# Patient Record
Sex: MALE | Race: BLACK OR AFRICAN AMERICAN | NOT HISPANIC OR LATINO | Employment: FULL TIME | URBAN - METROPOLITAN AREA
[De-identification: names, ages, dates, MRNs, and addresses within clinical notes are randomized per-mention and may not be internally consistent; named-entity substitution may affect disease eponyms.]

---

## 2022-08-16 ENCOUNTER — HOSPITAL ENCOUNTER (EMERGENCY)
Facility: HOSPITAL | Age: 27
Discharge: HOME | End: 2022-08-16
Attending: EMERGENCY MEDICINE
Payer: MEDICAID

## 2022-08-16 VITALS
HEIGHT: 73 IN | SYSTOLIC BLOOD PRESSURE: 138 MMHG | DIASTOLIC BLOOD PRESSURE: 88 MMHG | RESPIRATION RATE: 16 BRPM | OXYGEN SATURATION: 99 % | BODY MASS INDEX: 33.93 KG/M2 | WEIGHT: 256 LBS | TEMPERATURE: 98 F | HEART RATE: 80 BPM

## 2022-08-16 DIAGNOSIS — L73.2 HIDRADENITIS SUPPURATIVA: ICD-10-CM

## 2022-08-16 DIAGNOSIS — L02.211 ABDOMINAL WALL ABSCESS: Primary | ICD-10-CM

## 2022-08-16 DIAGNOSIS — L02.419 AXILLARY ABSCESS: ICD-10-CM

## 2022-08-16 PROCEDURE — 99284 EMERGENCY DEPT VISIT MOD MDM: CPT | Mod: 25

## 2022-08-16 PROCEDURE — 10060 I&D ABSCESS SIMPLE/SINGLE: CPT | Mod: LT

## 2022-08-16 PROCEDURE — 25000003 PHARM REV CODE 250: Performed by: PHYSICIAN ASSISTANT

## 2022-08-16 RX ORDER — IBUPROFEN 600 MG/1
600 TABLET ORAL EVERY 6 HOURS PRN
Qty: 20 TABLET | Refills: 0 | Status: SHIPPED | OUTPATIENT
Start: 2022-08-16

## 2022-08-16 RX ORDER — HYDROCODONE BITARTRATE AND ACETAMINOPHEN 5; 325 MG/1; MG/1
1 TABLET ORAL EVERY 6 HOURS PRN
Qty: 12 TABLET | Refills: 0 | Status: SHIPPED | OUTPATIENT
Start: 2022-08-16

## 2022-08-16 RX ORDER — LIDOCAINE HYDROCHLORIDE 10 MG/ML
10 INJECTION INFILTRATION; PERINEURAL
Status: COMPLETED | OUTPATIENT
Start: 2022-08-16 | End: 2022-08-16

## 2022-08-16 RX ADMIN — LIDOCAINE HYDROCHLORIDE 10 ML: 10 INJECTION, SOLUTION INFILTRATION; PERINEURAL at 01:08

## 2022-08-16 NOTE — Clinical Note
"Matthias Wadsworthem"  was seen and treated in our emergency department on 8/16/2022.  He may return to work on 08/18/2022.       If you have any questions or concerns, please don't hesitate to call.      SARAH Ma LPN"

## 2022-08-16 NOTE — ED PROVIDER NOTES
Encounter Date: 8/16/2022       History     Chief Complaint   Patient presents with    Abscess     Presents with painful abscess left axilla. Pt states has been increasing past 2 weeks. Pt states some drainage noted last night. Denies any fevers.     HPI   This 26-year-old white male presents emergency room complaining of an abscess on the right upper quadrant of the abdominal wall and in the left axilla.  The patient has had these before.  The patient has had symptoms gradually progressive over the course last 2 weeks.  He has a long history of recurrent abscesses.  There is no fever chills.  He is otherwise well.  Review of patient's allergies indicates:  No Known Allergies  Past Medical History:   Diagnosis Date    Asthma      History reviewed. No pertinent surgical history.  Family History   Problem Relation Age of Onset    Cancer Mother      Social History     Tobacco Use    Smoking status: Never Smoker    Smokeless tobacco: Never Used   Substance Use Topics    Alcohol use: Yes    Drug use: Yes     Types: Marijuana     Review of Systems  The patient was questioned specifically with regard to the following.  General: Fever, chills, sweats. Neuro: Headache. Eyes: eye problems. ENT: Ear pain, sore throat. Cardiovascular: Chest pain. Respiratory: Cough, shortness of breath. Gastrointestinal: Abdominal pain, vomiting, diarrhea. Genitourinary: Painful urination.  Musculoskeletal: Arm and leg problems. Skin: Rash.  The review of systems was negative except for the following:  Right upper quadrant abdominal wall abscess, left axillary abscess.  Physical Exam     Initial Vitals [08/16/22 1323]   BP Pulse Resp Temp SpO2   (!) 141/90 81 16 98.4 °F (36.9 °C) 97 %      MAP       --         Physical Exam  The patient was examined specifically for the following:   General:No significant distress, Good color, Warm and dry. Head and neck:Scalp atraumatic, Neck supple. Neurological:Appropriate conversation, Gross motor  deficits. Eyes:Conjugate gaze, Clear corneas. ENT: No epistaxis. Cardiac: Regular rate and rhythm, Grossly normal heart tones. Pulmonary: Wheezing, Rales. Gastrointestinal: Abdominal tenderness, Abdominal distention. Musculoskeletal: Extremity deformity, Apparent pain with range of motion of the joints. Skin: Rash.   The findings on examination were normal except for the following:  The patient has scarring in the left axilla.  There is a point that is draining a thin yellow pus.  The patient also has a 3 x 1.5 cm abscess in the right upper quadrant of the abdominal wall.  The lungs are clear the heart tones are normal the abdomen is soft.  The patient is not febrile ill or toxic.  ED Course   I & D - Incision and Drainage    Date/Time: 8/16/2022 2:16 PM  Location procedure was performed: [facility] EMERGENCY DEPARTMENT  Performed by: Selvin Jc MD  Authorized by: Selvin Jc MD   Anesthesia: local infiltration    Anesthesia:  Local Anesthetic: lidocaine 1% without epinephrine  Anesthetic total: 5 mL  Risk factor: underlying major vessel and  underlying major nerve  Scalpel size: 11  Incision type: single straight  Complexity: simple  Drainage: pus  Drainage amount: moderate  Wound treatment: incision  Packing material: 1/4 in gauze  Patient tolerance: Patient tolerated the procedure well with no immediate complications  Comments: There was a 2 cm incision on the abscess on the abdominal wall and a 2 cm incision in the left axilla.  The abdominal wall drained a small amount of pus.  The axillary abscess drained a small amount of pus.  I will treat the patient for pain and discharge to outpatient evaluation and treatment.  I doubt cellulitis.        Labs Reviewed - No data to display       Imaging Results    None       Medical decision making:  This patient has an abscess in the abdominal wall an abscess in the left axilla.  There is no evidence of generalized infection.  The patient does not have systemic  symptoms.  I will discharge on pain medicine the follow-up with primary care.       Medications   LIDOcaine HCL 10 mg/ml (1%) injection 10 mL (10 mLs Infiltration Given 8/16/22 1346)                          Clinical Impression:   Final diagnoses:  [L02.211] Abdominal wall abscess (Primary)  [L02.419] Axillary abscess  [L73.2] Hidradenitis suppurativa          ED Disposition Condition    Discharge Stable        ED Prescriptions     Medication Sig Dispense Start Date End Date Auth. Provider    HYDROcodone-acetaminophen (NORCO) 5-325 mg per tablet Take 1 tablet by mouth every 6 (six) hours as needed for Pain. 12 tablet 8/16/2022  Selvin Jc MD    ibuprofen (ADVIL,MOTRIN) 600 MG tablet Take 1 tablet (600 mg total) by mouth every 6 (six) hours as needed for Pain. 20 tablet 8/16/2022  Selvin Jc MD        Follow-up Information    None          Selvin Jc MD  08/16/22 [id number]

## 2022-08-16 NOTE — DISCHARGE INSTRUCTIONS
Please return immediately if you get worse or if new problems develop.  Please follow-up with your primary care doctor this week.  Rest.  Remove your packing in 24 hours.  Ibuprofen and Tylenol with hydrocodone for pain.  Please follow-up with the general surgeon above